# Patient Record
Sex: FEMALE | Race: WHITE | NOT HISPANIC OR LATINO | URBAN - METROPOLITAN AREA
[De-identification: names, ages, dates, MRNs, and addresses within clinical notes are randomized per-mention and may not be internally consistent; named-entity substitution may affect disease eponyms.]

---

## 2017-05-23 ENCOUNTER — EMERGENCY (EMERGENCY)
Facility: HOSPITAL | Age: 47
LOS: 1 days | Discharge: HOME | End: 2017-05-23
Attending: EMERGENCY MEDICINE
Payer: COMMERCIAL

## 2017-05-23 VITALS
OXYGEN SATURATION: 97 % | SYSTOLIC BLOOD PRESSURE: 126 MMHG | DIASTOLIC BLOOD PRESSURE: 98 MMHG | WEIGHT: 126.99 LBS | HEIGHT: 60 IN | HEART RATE: 81 BPM | RESPIRATION RATE: 18 BRPM | TEMPERATURE: 99 F

## 2017-05-23 DIAGNOSIS — R51 HEADACHE: ICD-10-CM

## 2017-05-23 DIAGNOSIS — R20.9 UNSPECIFIED DISTURBANCES OF SKIN SENSATION: ICD-10-CM

## 2017-05-23 DIAGNOSIS — F17.210 NICOTINE DEPENDENCE, CIGARETTES, UNCOMPLICATED: ICD-10-CM

## 2017-05-23 PROCEDURE — 99283 EMERGENCY DEPT VISIT LOW MDM: CPT

## 2017-05-23 PROCEDURE — 99282 EMERGENCY DEPT VISIT SF MDM: CPT

## 2017-05-23 NOTE — ED PROVIDER NOTE - OBJECTIVE STATEMENT
46 y/o F pt with no PMHx and no PSHx presents to ED c/o intermittent L scalp pain with associated tingling x3 weeks. Pt also reports occasional sharp pain in scalp as well. Pt states she visited her PMD, who suspected the pt has folliculitis; pt was given topical Abx's, which resolved pt's sx's until x2 days ago, when the pt developed scalp pain again. Pt took Motrin earlier today with moderate relief of sx's. Pt presents in ED because she began "thinking of the possibilities of what this pain could be". Pt denies HA, visual changes, fever, chills, ear pain, sore throat, cough, fatigue, or any other complaints. Pt also denies recent travel. Allergies: Benadryl (fast heart rate).

## 2017-05-23 NOTE — ED PROVIDER NOTE - MEDICAL DECISION MAKING DETAILS
48 y/o F pt presents with scalp tingling. Physical exam is completely normal. Will tentatively d/c with dermatology and neurology f/u.

## 2017-05-23 NOTE — ED ADULT NURSE NOTE - OBJECTIVE STATEMENT
Patient complains of left lower side of scalp, tingling sensation to scalp x 3 weeks. No bleeding, drainage, bumps noted. Patient states went to PMD and was treated for folliculitis. Denies fever, chills, nausea, vomiting. Breathing easy and unlabored, speaking in full sentences, no use of accessory muscles.

## 2017-05-23 NOTE — ED PROVIDER NOTE - NS ED MD SCRIBE ATTENDING SCRIBE SECTIONS
HISTORY OF PRESENT ILLNESS/VITAL SIGNS( Pullset)/HIV/DISPOSITION/PAST MEDICAL/SURGICAL/SOCIAL HISTORY/REVIEW OF SYSTEMS/PHYSICAL EXAM

## 2019-01-06 ENCOUNTER — TRANSCRIPTION ENCOUNTER (OUTPATIENT)
Age: 49
End: 2019-01-06

## 2020-01-29 ENCOUNTER — TRANSCRIPTION ENCOUNTER (OUTPATIENT)
Age: 50
End: 2020-01-29

## 2022-08-02 NOTE — ED PROVIDER NOTE - GASTROINTESTINAL, MLM
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
